# Patient Record
Sex: MALE | Race: OTHER
[De-identification: names, ages, dates, MRNs, and addresses within clinical notes are randomized per-mention and may not be internally consistent; named-entity substitution may affect disease eponyms.]

---

## 2017-02-01 ENCOUNTER — OFFICE VISIT (OUTPATIENT)
Dept: FAMILY MEDICINE CLINIC | Facility: CLINIC | Age: 37
End: 2017-02-01

## 2017-02-01 VITALS
HEART RATE: 103 BPM | HEIGHT: 68 IN | SYSTOLIC BLOOD PRESSURE: 134 MMHG | WEIGHT: 192.6 LBS | OXYGEN SATURATION: 95 % | BODY MASS INDEX: 29.19 KG/M2 | DIASTOLIC BLOOD PRESSURE: 85 MMHG

## 2017-02-01 DIAGNOSIS — H61.22 IMPACTED CERUMEN, LEFT EAR: ICD-10-CM

## 2017-02-01 DIAGNOSIS — Z00.00 HEALTHCARE MAINTENANCE: ICD-10-CM

## 2017-02-01 DIAGNOSIS — R06.89 HEAVY BREATHING: Primary | ICD-10-CM

## 2017-02-01 DIAGNOSIS — G89.29 CHRONIC RIGHT SHOULDER PAIN: ICD-10-CM

## 2017-02-01 DIAGNOSIS — M25.511 CHRONIC RIGHT SHOULDER PAIN: ICD-10-CM

## 2017-02-01 PROCEDURE — 99204 OFFICE O/P NEW MOD 45 MIN: CPT | Performed by: FAMILY MEDICINE

## 2017-02-01 RX ORDER — NAPROXEN 500 MG/1
500 TABLET ORAL 2 TIMES DAILY WITH MEALS
Qty: 60 TABLET | Refills: 3 | Status: SHIPPED | OUTPATIENT
Start: 2017-02-01

## 2017-02-01 RX ORDER — CYCLOBENZAPRINE HCL 5 MG
5 TABLET ORAL EVERY 8 HOURS PRN
Qty: 30 TABLET | Refills: 0 | Status: SHIPPED | OUTPATIENT
Start: 2017-02-01 | End: 2017-02-11

## 2025-01-04 ENCOUNTER — HOSPITAL ENCOUNTER (EMERGENCY)
Age: 45
Discharge: HOME | End: 2025-01-04
Attending: STUDENT IN AN ORGANIZED HEALTH CARE EDUCATION/TRAINING PROGRAM
Payer: COMMERCIAL

## 2025-01-04 VITALS
DIASTOLIC BLOOD PRESSURE: 96 MMHG | SYSTOLIC BLOOD PRESSURE: 144 MMHG | TEMPERATURE: 98.8 F | HEIGHT: 68 IN | WEIGHT: 207 LBS | RESPIRATION RATE: 16 BRPM | OXYGEN SATURATION: 96 % | BODY MASS INDEX: 31.37 KG/M2 | HEART RATE: 94 BPM

## 2025-01-04 DIAGNOSIS — S05.02XA ABRASION OF LEFT CORNEA, INITIAL ENCOUNTER: Primary | ICD-10-CM

## 2025-01-04 DIAGNOSIS — Z98.890: ICD-10-CM

## 2025-01-04 DIAGNOSIS — T15.02XA RUST RING OF LEFT CORNEA DUE TO METALLIC FOREIGN BODY: ICD-10-CM

## 2025-01-04 PROCEDURE — 65220 REMOVE FOREIGN BODY FROM EYE: CPT

## 2025-01-04 PROCEDURE — 6370000000 HC RX 637 (ALT 250 FOR IP): Performed by: STUDENT IN AN ORGANIZED HEALTH CARE EDUCATION/TRAINING PROGRAM

## 2025-01-04 PROCEDURE — 99283 EMERGENCY DEPT VISIT LOW MDM: CPT

## 2025-01-04 RX ORDER — GENTAMICIN SULFATE 3 MG/ML
1 SOLUTION/ DROPS OPHTHALMIC 4 TIMES DAILY
COMMUNITY

## 2025-01-04 RX ORDER — TETRACAINE HYDROCHLORIDE 5 MG/ML
1 SOLUTION OPHTHALMIC ONCE
Status: COMPLETED | OUTPATIENT
Start: 2025-01-04 | End: 2025-01-04

## 2025-01-04 RX ADMIN — FLUORESCEIN SODIUM 1 MG: 1 STRIP OPHTHALMIC at 23:58

## 2025-01-04 RX ADMIN — TETRACAINE HYDROCHLORIDE 1 DROP: 5 SOLUTION OPHTHALMIC at 23:57

## 2025-01-04 ASSESSMENT — VISUAL ACUITY
OS: 20/25
OD: 20/10
OU: 20/25

## 2025-01-04 ASSESSMENT — PAIN DESCRIPTION - LOCATION: LOCATION: EYE

## 2025-01-04 ASSESSMENT — PAIN DESCRIPTION - ORIENTATION: ORIENTATION: LEFT

## 2025-01-04 ASSESSMENT — PAIN SCALES - GENERAL: PAINLEVEL_OUTOF10: 7

## 2025-01-04 ASSESSMENT — PAIN - FUNCTIONAL ASSESSMENT: PAIN_FUNCTIONAL_ASSESSMENT: 0-10

## 2025-01-05 NOTE — ED PROVIDER NOTES
[facility] Emergency Department  [address]  [facility] [address]  Phone: [phone]  Fax: [phone]  EMERGENCY DEPARTMENT ENCOUNTER      Pt Name: Suhas Koehler  MRN: [id number]  Birthdate 1980  Date of evaluation: 1/4/2025    CHIEF COMPLAINT       Chief Complaint   Patient presents with    Eye Problem     Pt reports he was grinding metal in his house on thursday, since then he has had pain and redness in left eye. Has been to urgent care X2. Using gentamicin eye gtts, but not getting any better, provider at urgent care today reported that something is in his eye but they were unable to removed it.        HISTORY OF PRESENT ILLNESS    Suhas Koehler is a 44 y.o. male who presents to the emergency department with left eye pain.  Patient was grinding metal at his house on Thursday and has since had eye irritation.  He states he does have a foreign body sensation, has been urgent care twice.  Initially they started him on gentamicin eyedrops, he went there again today due to continued foreign body sensation and they were able to see a foreign body and asked him to present to the ER as they did not have the equipment to remove it.  Patient does not wear contacts, is on gentamicin eyedrops at this time, denies any visual changes.    REVIEW OF SYSTEMS     Positive: Foreign body in eye  Negative: Vision changes, lightheadedness, dizziness, discharge    PAST MEDICAL HISTORY    has no past medical history on file.    SURGICAL HISTORY      has no past surgical history on file.    CURRENT MEDICATIONS       Discharge Medication List as of 1/4/2025 11:43 PM        CONTINUE these medications which have NOT CHANGED    Details   gentamicin (GARAMYCIN) 0.3 % ophthalmic solution Place 1 drop into the left eye 4 times dailyHistorical Med             ALLERGIES     has No Known Allergies.    FAMILY HISTORY     He indicated that his mother is alive. He indicated that his father is alive.     family

## 2025-01-05 NOTE — DISCHARGE INSTRUCTIONS
Call today or tomorrow to follow up with the eye doctor. Continue using the eye drops you have.     Take your medication as indicated and prescribed.  If you are given an antibiotic, then make sure you get the prescription filled and take the antibiotics until finished. diphenhydramine (Benadryl) can be used to help with any itching.    For pain use ibuprofen (Motrin / Advil) or acetaminophen (Tylenol), unless prescribed medications that have acetaminophen in it.  You can take over the counter acetaminophen tablets (1 - 2 tablets of the 500-mg strength every 6 hours) or ibuprofen tablets (2 tablets every 4 hours).      PLEASE RETURN TO THE EMERGENCY DEPARTMENT IMMEDIATELY for worsening symptoms, swelling or drainage from the eye, the white of your eye turns red, inability to see, or if you develop any concerning symptoms such as: high fever not relieved by acetaminophen (Tylenol) and/or ibuprofen (Motrin / Advil), chills, shortness of breath, chest pain, feeling of your heart fluttering or racing, persistent nausea and/or vomiting, vomiting up blood, blood in your stool, numbness, loss of consciousness, weakness or tingling in the arms or legs or change in color of the extremities, changes in mental status, persistent headache, blurry vision, loss of bladder / bowel control, unable to follow up with your physician, or other any other care or concern.